# Patient Record
Sex: MALE | URBAN - METROPOLITAN AREA
[De-identification: names, ages, dates, MRNs, and addresses within clinical notes are randomized per-mention and may not be internally consistent; named-entity substitution may affect disease eponyms.]

---

## 2024-03-25 ENCOUNTER — APPOINTMENT (RX ONLY)
Dept: URBAN - METROPOLITAN AREA CLINIC 26 | Facility: CLINIC | Age: 74
Setting detail: DERMATOLOGY
End: 2024-03-25

## 2024-03-25 PROBLEM — D03.9 MELANOMA IN SITU, UNSPECIFIED: Status: ACTIVE | Noted: 2024-03-25

## 2024-03-25 PROBLEM — C44.91 BASAL CELL CARCINOMA OF SKIN, UNSPECIFIED: Status: ACTIVE | Noted: 2024-03-25

## 2024-03-25 NOTE — HPI: MOHS SURGERY CONSULTATION
[id number]: previous_biopsy_has_been_previously_biopsied
When Was Your Biopsy?: 04/26/2024
Accession (Optional): [id number]
Who Is Your Referring Provider?: Dr. Miri Cardona
Has The Cancer Been Biopsied Before?: has been previously biopsied
Accession (Optional): [id number]